# Patient Record
Sex: FEMALE | Race: ASIAN | Employment: OTHER | URBAN - METROPOLITAN AREA
[De-identification: names, ages, dates, MRNs, and addresses within clinical notes are randomized per-mention and may not be internally consistent; named-entity substitution may affect disease eponyms.]

---

## 2024-11-04 ENCOUNTER — OFFICE VISIT (OUTPATIENT)
Dept: URGENT CARE | Facility: CLINIC | Age: 69
End: 2024-11-04
Payer: COMMERCIAL

## 2024-11-04 VITALS
DIASTOLIC BLOOD PRESSURE: 78 MMHG | HEIGHT: 65 IN | RESPIRATION RATE: 18 BRPM | OXYGEN SATURATION: 98 % | SYSTOLIC BLOOD PRESSURE: 134 MMHG | BODY MASS INDEX: 25.33 KG/M2 | TEMPERATURE: 96.7 F | HEART RATE: 70 BPM | WEIGHT: 152 LBS

## 2024-11-04 DIAGNOSIS — L03.114 CELLULITIS OF LEFT FOREARM: Primary | ICD-10-CM

## 2024-11-04 DIAGNOSIS — L03.129: ICD-10-CM

## 2024-11-04 PROCEDURE — 99204 OFFICE O/P NEW MOD 45 MIN: CPT | Performed by: PHYSICIAN ASSISTANT

## 2024-11-04 RX ORDER — LEVOTHYROXINE SODIUM 100 UG/1
TABLET ORAL
COMMUNITY
Start: 2024-10-24

## 2024-11-04 RX ORDER — DIAZEPAM 2 MG/1
TABLET ORAL
COMMUNITY
Start: 2024-10-24

## 2024-11-04 RX ORDER — ATORVASTATIN CALCIUM 10 MG/1
TABLET, FILM COATED ORAL
COMMUNITY
Start: 2024-10-24

## 2024-11-04 RX ORDER — CLINDAMYCIN HYDROCHLORIDE 300 MG/1
300 CAPSULE ORAL 3 TIMES DAILY
Qty: 30 CAPSULE | Refills: 0 | Status: SHIPPED | OUTPATIENT
Start: 2024-11-04 | End: 2024-11-14

## 2024-11-04 RX ORDER — POLYETHYLENE GLYCOL-3350, SODIUM CHLORIDE, POTASSIUM CHLORIDE AND SODIUM BICARBONATE 420; 11.2; 5.72; 1.48 G/438.4G; G/438.4G; G/438.4G; G/438.4G
POWDER, FOR SOLUTION ORAL
COMMUNITY
Start: 2024-08-23

## 2024-11-04 NOTE — PROGRESS NOTES
[facility]        NAME: Kirstie Francois is a 69 y.o. female  : 1955    MRN: [id number]  DATE: 2024  TIME: 1:35 PM      Assessment and Plan     Cellulitis of left forearm [L03.114]  1. Cellulitis of left forearm  clindamycin (CLEOCIN) 300 MG capsule      2. Acute lymphangitis of upper arm  Transfer to other facility    clindamycin (CLEOCIN) 300 MG capsule        Note:   Explained to patient that my recommendation is to go to the ER for further evaluation and treatment due to the lymphangitis, but patient states she probably will not go and wants outpatient antibiotic treatment. Will send clindamycin, but told patient that my first recommendation is to go to the ER, but I will not leave her with nothing because it will get worse. Patient verbalized understanding.     Patient Instructions   There are no Patient Instructions on file for this visit.     Follow up with primary care provider.   Go to ER if symptoms worsen.    Chief Complaint     Chief Complaint   Patient presents with    Insect Bite     Pt states she woke up yesterday with a bug bite on her left arm that is red and swollen, painful and itchy at touch.           History of Present Illness     Patient presents with left forearm pain and swelling x yesterday morning. She is currently staying at [facility]. She states she had a bug bite on that arm that has gotten worse. She has been using triamcinolone cream without relief.         Review of Systems     Review of Systems   Constitutional:  Negative for chills, fatigue and fever.   HENT:  Negative for congestion, ear pain, postnasal drip, rhinorrhea, sinus pressure, sinus pain and sore throat.    Eyes:  Negative for pain and visual disturbance.   Respiratory:  Negative for cough, chest tightness and shortness of breath.    Cardiovascular:  Negative for chest pain and palpitations.   Gastrointestinal:  Negative for abdominal pain, diarrhea, nausea and vomiting.   Genitourinary:   "Negative for dysuria and hematuria.   Musculoskeletal:  Positive for arthralgias, joint swelling and myalgias. Negative for back pain.   Skin:  Positive for color change, rash and wound.   Neurological:  Negative for dizziness, seizures, syncope, numbness and headaches.   All other systems reviewed and are negative.        Current Medications       Current Outpatient Medications:     atorvastatin (LIPITOR) 10 mg tablet, , Disp: , Rfl:     clindamycin (CLEOCIN) 300 MG capsule, Take 1 capsule (300 mg total) by mouth 3 (three) times a day for 10 days, Disp: 30 capsule, Rfl: 0    diazepam (VALIUM) 2 mg tablet, , Disp: , Rfl:     GaviLyte-N with Flavor Pack 420 g solution, , Disp: , Rfl:     levothyroxine 100 mcg tablet, , Disp: , Rfl:     Current Allergies     Allergies as of 11/04/2024 - Reviewed 11/04/2024   Allergen Reaction Noted    Penicillins Hives 11/04/2024              The following portions of the patient's history were reviewed and updated as appropriate: allergies, current medications, past family history, past medical history, past social history, past surgical history, and problem list.     Past Medical History:   Diagnosis Date    Disease of thyroid gland     Hypertension        Past Surgical History:   Procedure Laterality Date    TOTAL THYROIDECTOMY         History reviewed. No pertinent family history.      Medications have been verified.        Objective     /78   Pulse 70   Temp (!) 96.7 °F (35.9 °C)   Resp 18   Ht 5' 5\" (1.651 m)   Wt 68.9 kg (152 lb)   SpO2 98%   BMI 25.29 kg/m²   No LMP recorded.         Physical Exam     Physical Exam  Vitals and nursing note reviewed.   Constitutional:       Appearance: Normal appearance. She is normal weight.   HENT:      Head: Normocephalic and atraumatic.   Cardiovascular:      Rate and Rhythm: Normal rate and regular rhythm.      Heart sounds: Normal heart sounds.   Pulmonary:      Effort: Pulmonary effort is normal.      Breath sounds: Normal " breath sounds.   Skin:     General: Skin is warm.      Findings: Erythema present.      Comments: Insect bite on the anterior mid-forearm. Erythema, warmth to touch, and tenderness to palpation surrounding it in about a 10cm diameter. Faint red streaking noted going up to anterior elbow area.    Neurological:      General: No focal deficit present.      Mental Status: She is alert and oriented to person, place, and time.   Psychiatric:         Mood and Affect: Mood normal.         Behavior: Behavior normal.